# Patient Record
Sex: FEMALE | Race: WHITE | Employment: UNEMPLOYED | URBAN - METROPOLITAN AREA
[De-identification: names, ages, dates, MRNs, and addresses within clinical notes are randomized per-mention and may not be internally consistent; named-entity substitution may affect disease eponyms.]

---
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
## 2024-02-08 ENCOUNTER — HOSPITAL ENCOUNTER (OUTPATIENT)
Dept: CARDIOLOGY | Facility: CLINIC | Age: 19
Discharge: HOME | End: 2024-02-08
Payer: COMMERCIAL

## 2024-02-08 ENCOUNTER — OFFICE VISIT (OUTPATIENT)
Dept: CARDIOLOGY | Facility: CLINIC | Age: 19
End: 2024-02-08
Payer: COMMERCIAL

## 2024-02-08 VITALS
DIASTOLIC BLOOD PRESSURE: 80 MMHG | WEIGHT: 159.9 LBS | OXYGEN SATURATION: 96 % | SYSTOLIC BLOOD PRESSURE: 121 MMHG | BODY MASS INDEX: 32.57 KG/M2 | HEART RATE: 78 BPM

## 2024-02-08 DIAGNOSIS — Q25.3 SUPRAVALVAR AORTIC STENOSIS: ICD-10-CM

## 2024-02-08 DIAGNOSIS — Q25.6 CONGENITAL SUPRAVALVULAR PULMONARY STENOSIS: ICD-10-CM

## 2024-02-08 DIAGNOSIS — I70.1 RENAL ARTERY STENOSIS (H): ICD-10-CM

## 2024-02-08 DIAGNOSIS — Q93.82 WILLIAMS SYNDROME: ICD-10-CM

## 2024-02-08 PROCEDURE — 99213 OFFICE O/P EST LOW 20 MIN: CPT | Mod: 25

## 2024-02-08 PROCEDURE — 93325 DOPPLER ECHO COLOR FLOW MAPG: CPT | Mod: 26 | Performed by: PEDIATRICS

## 2024-02-08 PROCEDURE — 93010 ELECTROCARDIOGRAM REPORT: CPT | Performed by: INTERNAL MEDICINE

## 2024-02-08 PROCEDURE — 93320 DOPPLER ECHO COMPLETE: CPT | Mod: 26 | Performed by: PEDIATRICS

## 2024-02-08 PROCEDURE — 93325 DOPPLER ECHO COLOR FLOW MAPG: CPT

## 2024-02-08 PROCEDURE — 93303 ECHO TRANSTHORACIC: CPT | Mod: 26 | Performed by: PEDIATRICS

## 2024-02-08 PROCEDURE — 93005 ELECTROCARDIOGRAM TRACING: CPT

## 2024-02-08 PROCEDURE — 99204 OFFICE O/P NEW MOD 45 MIN: CPT | Mod: 25

## 2024-02-08 RX ORDER — ATENOLOL 50 MG/1
75 TABLET ORAL DAILY
Qty: 45 TABLET | Refills: 11 | Status: SHIPPED | OUTPATIENT
Start: 2024-02-08

## 2024-02-08 ASSESSMENT — PAIN SCALES - GENERAL: PAINLEVEL: NO PAIN (0)

## 2024-02-08 NOTE — PROGRESS NOTES
Adult Congenital Cardiology New Patient Visit    Patient:  Rebekah Aly MRN:  [id number]   YOB: 2005 Age:  18 year old   Date of Visit:  Feb 8, 2024 PCP:  Elba Mcdowell MD     Dear Dr. Mcdowell,     I had the pleasure of seeing your patient Rebekah Aly at the [facility] on Feb 8, 2024.   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 She was last seen by me in pediatric cardiology clinic in 2019. Rebekah underwent operative repair of supravalvar aortic stenosis on 01/21/2014 by Dr. Sarthak Robert. Postoperatively, Rebekah's hypertension was managed by Dr. Suresh from pediatric neurology who had since retired. She remains on atenolol 75 mg daily and does miss occasional doses .     Rebekah graduated from  last year and is a transitional program. Plans on joining the [employer] and living on campus in the future. She lives at home with family and walks the dog regularly.   She denies any chest pain, SOB, dizziness, LOC, palpitations or other cardiac symptoms. She is menstruating and has heavy menses. She is on iron supplementation.  No hx of arrhythmias.     Cardiac Hx:   H/O VSD- closed spontaneously.  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  Hypertension with renal artery stenosis.      Past Medical History:   Diagnosis Date    * * * SBE PROPHYLAXIS * * *     35-36 completed weeks of gestation(765.28)     Gingival hypertrophy     Gingival hypertrophy     Hypertension     Supravalvular aortic stenosis     Torticollis     VSD (ventricular septal defect)     Kash syndrome      Past Surgical History:   Procedure Laterality Date    ANGIOGRAM      renal arteries    INCISION AND DRAINAGE CHEST WASHOUT, COMBINED  1/21/2014     Procedure: COMBINED INCISION AND DRAINAGE CHEST WASHOUT;  Chest washout;  Surgeon: Sarthak Hughes MD;  Location: [facility] OR    REPAIR VALVE AORTIC CHILD  2014    Procedure: REPAIR VALVE AORTIC CHILD;  Repair of Supravalvar Aortic Stenosis  ;  Surgeon: Sarthak Hughes MD;  Location: [facility] OR     Prescription Medications as of 2024         Rx Number Disp Refills Start End Last Dispensed Date Next Fill Date Owning Pharmacy    atenolol (TENORMIN) 50 MG tablet  45 tablet 11 10/23/2023 --   [id number] - [place], [place] - [facility] [facility] [address] [facility]    Sig: Take 1.5 tablets (75 mg) by mouth daily    Class: E-Prescribe    Route: Oral    calcium carbonate (TUMS) 500 MG chewable tablet  -- --  --       Sig: Take 1 chew tab by mouth 2 times daily    Class: Historical    Route: Oral    Ferrous Gluconate-C-Folic Acid (IRON-C PO)  -- --  --       Class: Historical    Route: Oral    lactobacillus rhamnosus, GG, (CULTURELL) capsule  -- --  --       Sig: Take 1 capsule by mouth 2 times daily    Class: Historical    Route: Oral    MULTI-VITAMIN OR TABS  0 0 2009 --       Si daily in am    Class: Historical    Route: Oral            Allergies: nkda.      Family History:   The family history was reviewed and updated today. No CHD, arrhythmia, sudden death.  HAs twin brother who is in college    Social history:    In HS transition program   Lives with her parents.  Rebekah has three healthy siblings.   No smoking/vaping/EtoH  Parents are co-guardians    Review of Systems: A comprehensive review of systems was performed and is negative, except as noted in the HPI and PMH.  Rebekah is very anxious about new and unknown procedures. We spent a long time today talking about what a MRI might be like in the future.     Physical exam:  Her weight is 72.5 kg (159 lb 14.4 oz). Her blood pressure is 121/80 and her pulse is 78. Her oxygen saturation is 96%.   Her body mass index is 32.57 kg/m .  Her  body surface area is 1.73 meters squared.   Rebekah is a pleasant young woman in no distress. She is comfortable and talkative. She has no central or peripheral cyanosis. Pupils are reactive and sclera are not icteric. There is no conjunctival injection or discharge. EOM grossly intact. She is wearing glasses.  Mucous membranes are moist and pink. Dentition appears healthy with spacing irregularities. Neck is supple with no thyroid enlargement.   Lungs are clear to ausculation bilaterally with no wheezes, rales or rhonchi. There is no increased work of breathing, retractions or nasal flaring. THere is a well healed midline sternotomy scar. Precordium is quiet with a normally placed apical impulse. On auscultation, heart sounds are regular with normal S1 and physiologically split S2. There is a grade 1-2/6 CLARISA at the LMSB with no DM, rubs or gallops.  Abdomen is soft and non-tender without masses or hepatomegaly. Femoral pulses are normal with no brachial femoral delay.Skin is without rashes, lesions, or significant bruising. Extremities are warm and well-perfused with no cyanosis, clubbing or edema. Peripheral pulses are normal and there is < 2 sec capillary refill. Patient is alert and oriented, has developmental delay, outgoing. Moves all extremities equally with normal tone.     12 Lead EKG performed today shows normal sinus rhythm at a rate of 78 bpm with normal intervals. There are nonspecific T wave changes that are not different from prior ECG's. QTC is ULN. Overall unchanged.     A zio patch monitor done in February of 2016 was normal with an average HR of 79 bpm, (range ) rare PAC's (10) and rare PVC's (5). There were no sustained arrhythmias or block. One episode of symptoms was during sinus rhythm at a rate of 71 bpm.     Normal CBC and renal panel October 2023    Echo: Normal LV size and function.   Mild thickening of the aortic valve with no obstruction.    Supravalvar aortic stenosis after  surgical repair.     Dysplastic and thickened aortic valve.There is no aortic valve insufficiency  or stenosis.There is unobstructed antegrade flow in the ascending aorta.  Trivial aortic valve insufficiency.There is mild left atrial enlargement.  Normal left and right ventricular systolic function.The calculated single  plane left ventricular ejection fraction from the 4 chamber view is 59 %.     Compared to the previous echo the aortic valve leaflets looks thickened but  without insufficiency or stenosis. There is mild left atrial enlargement.    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 She does have mild thickening of aortic valve without obstriction. She has known renal artery stenosis and hypertension THat is well controlled on atenolol.   She is asymptomatic from a cardiac standpoint.     Recommendations:    1. Continue atenolol 75 mg daily (refilled)  2. Regular dental cleanings with no SBE prophylaxis required  3. Genetics referral to Dr. Kennedy for Kash syndrome  3. Continue regular activity, including walking, rest if tired or new symptoms.  4. Continue multivit with heavy menstruation  5. Regular dental cleanings, no antibiotic premedication required at present.   6.  Needs to establish care with adult nephrology, consider aortic coronary and renal artery imaging.   7. Follow up 2 years with repeat echocardiogram and ECG   Please call if any new symptoms or concerns.      Thank you for the opportunity to participate in Rebekah's care.    Diagnoses:   Kash syndrome  Status post repair of supravalvular aortic stenosis  Hypertension - currently well controlled.   Renal artery stenosis    Sincerely,    Andrews Chowdhury M.D.   of Pediatrics  Pediatric and Adult Congenital Cardiology  [facility]  [facility]  Pediatric Cardiology Office [phone]  Adult Congenital Cardiology Triage and Scheduling [phone]    A total of 45 minutes were spent in personal review of testing, including echo, ECG and labs, review of documented history and interval events in chart, additional history from father, face to face visit with discussion of findings and plan, and documentation.      CC:  Family of Rebekah Aly

## 2024-02-08 NOTE — LETTER
2/8/2024      RE: Rebekah Aly  [address] [facility] [address]       Dear Colleague,    Thank you for the opportunity to participate in the care of your patient, Rebekah Aly, at the [facility] [place] at [facility]. Please see a copy of my visit note below.    Adult Congenital Cardiology New Patient Visit    Patient:  Rebekah Aly MRN:  [id number]   YOB: 2005 Age:  18 year old   Date of Visit:  Feb 8, 2024 PCP:  Elba Mcdowell MD     Dear Dr. Mcdowell,     I had the pleasure of seeing your patient Rebekah Aly at the [facility] on Feb 8, 2024.   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 She was last seen by me in pediatric cardiology clinic in 2019. Rebekah underwent operative repair of supravalvar aortic stenosis on 01/21/2014 by Dr. Sarthak Robert. Postoperatively, Rebekah's hypertension was managed by Dr. Suresh from pediatric neurology who had since retired. She remains on atenolol 75 mg daily and does miss occasional doses .     Rebekah graduated from  last year and is a transitional program. Plans on joining the [employer] program and living on campus in the future. She lives at home with family and walks the dog regularly.   She denies any chest pain, SOB, dizziness, LOC, palpitations or other cardiac symptoms. She is menstruating and has heavy menses. She is on iron supplementation.  No hx of arrhythmias.     Cardiac Hx:   H/O VSD- closed spontaneously.  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  Hypertension with renal artery stenosis.      Past Medical History:   Diagnosis Date     * * * SBE PROPHYLAXIS * * *      35-36 completed weeks of  gestation(765.28)      Gingival hypertrophy      Gingival hypertrophy      Hypertension      Supravalvular aortic stenosis      Torticollis      VSD (ventricular septal defect)      Kash syndrome      Past Surgical History:   Procedure Laterality Date     ANGIOGRAM      renal arteries     INCISION AND DRAINAGE CHEST WASHOUT, COMBINED  2014    Procedure: COMBINED INCISION AND DRAINAGE CHEST WASHOUT;  Chest washout;  Surgeon: Sarthak Hughes MD;  Location: [facility] OR     REPAIR VALVE AORTIC CHILD  2014    Procedure: REPAIR VALVE AORTIC CHILD;  Repair of Supravalvar Aortic Stenosis  ;  Surgeon: Sarthak Hughes MD;  Location: [facility] OR     Prescription Medications as of 2024         Rx Number Disp Refills Start End Last Dispensed Date Next Fill Date Owning Pharmacy    atenolol (TENORMIN) 50 MG tablet  45 tablet 11 10/23/2023 --   [facility]/pharmacy #[id number] - [place], [place] - [address] [facility]    Sig: Take 1.5 tablets (75 mg) by mouth daily    Class: E-Prescribe    Route: Oral    calcium carbonate (TUMS) 500 MG chewable tablet  -- --  --       Sig: Take 1 chew tab by mouth 2 times daily    Class: Historical    Route: Oral    Ferrous Gluconate-C-Folic Acid (IRON-C PO)  -- --  --       Class: Historical    Route: Oral    lactobacillus rhamnosus, GG, (CULTURELL) capsule  -- --  --       Sig: Take 1 capsule by mouth 2 times daily    Class: Historical    Route: Oral    MULTI-VITAMIN OR TABS  0 0 2009 --       Si daily in am    Class: Historical    Route: Oral            Allergies: nkda.      Family History:   The family history was reviewed and updated today. No CHD, arrhythmia, sudden death.  HAs twin brother who is in college    Social history:    In HS transition program   Lives with her parents.  Rebekah has three healthy siblings.   No smoking/vaping/EtoH  Parents are co-guardians    Review of Systems: A comprehensive review of systems was performed and is negative,  except as noted in the HPI and PMH.  Rebekah is very anxious about new and unknown procedures. We spent a long time today talking about what a MRI might be like in the future.     Physical exam:  Her weight is 72.5 kg (159 lb 14.4 oz). Her blood pressure is 121/80 and her pulse is 78. Her oxygen saturation is 96%.   Her body mass index is 32.57 kg/m .  Her body surface area is 1.73 meters squared.   Rebekah is a pleasant young woman in no distress. She is comfortable and talkative. She has no central or peripheral cyanosis. Pupils are reactive and sclera are not icteric. There is no conjunctival injection or discharge. EOM grossly intact. She is wearing glasses.  Mucous membranes are moist and pink. Dentition appears healthy with spacing irregularities. Neck is supple with no thyroid enlargement.   Lungs are clear to ausculation bilaterally with no wheezes, rales or rhonchi. There is no increased work of breathing, retractions or nasal flaring. THere is a well healed midline sternotomy scar. Precordium is quiet with a normally placed apical impulse. On auscultation, heart sounds are regular with normal S1 and physiologically split S2. There is a grade 1-2/6 CLARISA at the LMSB with no DM, rubs or gallops.  Abdomen is soft and non-tender without masses or hepatomegaly. Femoral pulses are normal with no brachial femoral delay.Skin is without rashes, lesions, or significant bruising. Extremities are warm and well-perfused with no cyanosis, clubbing or edema. Peripheral pulses are normal and there is < 2 sec capillary refill. Patient is alert and oriented, has developmental delay, outgoing. Moves all extremities equally with normal tone.     12 Lead EKG performed today shows normal sinus rhythm at a rate of 78 bpm with normal intervals. There are nonspecific T wave changes that are not different from prior ECG's. QTC is ULN. Overall unchanged.     A zio patch monitor done in February of 2016 was normal with an average HR of  79 bpm, (range ) rare PAC's (10) and rare PVC's (5). There were no sustained arrhythmias or block. One episode of symptoms was during sinus rhythm at a rate of 71 bpm.     Normal CBC and renal panel October 2023    Echo: Normal LV size and function.   Mild thickening of the aortic valve with no obstruction.    Supravalvar aortic stenosis after surgical repair.     Dysplastic and thickened aortic valve.There is no aortic valve insufficiency  or stenosis.There is unobstructed antegrade flow in the ascending aorta.  Trivial aortic valve insufficiency.There is mild left atrial enlargement.  Normal left and right ventricular systolic function.The calculated single  plane left ventricular ejection fraction from the 4 chamber view is 59 %.     Compared to the previous echo the aortic valve leaflets looks thickened but  without insufficiency or stenosis. There is mild left atrial enlargement.    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 She does have mild thickening of aortic valve without obstriction. She has known renal artery stenosis and hypertension THat is well controlled on atenolol.   She is asymptomatic from a cardiac standpoint.     Recommendations:    1. Continue atenolol 75 mg daily (refilled)  2. Regular dental cleanings with no SBE prophylaxis required  3. Genetics referral to Dr. Kennedy for Kash syndrome  3. Continue regular activity, including walking, rest if tired or new symptoms.  4. Continue multivit with heavy menstruation  5. Regular dental cleanings, no antibiotic premedication required at present.   6.  Needs to establish care with adult nephrology, consider aortic coronary and renal artery imaging.   7. Follow up 2 years with repeat echocardiogram and ECG   Please call if any new symptoms or concerns.      Thank you for the  opportunity to participate in Rebekah's care.    Diagnoses:   Kash syndrome  Status post repair of supravalvular aortic stenosis  Hypertension - currently well controlled.   Renal artery stenosis    Sincerely,    Andrews Chowdhury M.D.   of Pediatrics  Pediatric and Adult Congenital Cardiology  [facility]  Pediatric Cardiology Office [phone]  Adult Congenital Cardiology Triage and Scheduling [phone]    A total of 45 minutes were spent in personal review of testing, including echo, ECG and labs, review of documented history and interval events in chart, additional history from father, face to face visit with discussion of findings and plan, and documentation.      CC:  Family of Rebekah Aly      Please do not hesitate to contact me if you have any questions/concerns.     Sincerely,     Andrews Chowdhury MD

## 2024-02-08 NOTE — PATIENT INSTRUCTIONS
You were seen today in the Adult Congenital and Cardiovascular Genetics Clinic at the [facility].    Cardiology Providers you saw during your visit:  Andrews Chowdhury MD    Diagnosis:  Kash syndrome    Results:  Andrews Chowdhury MD reviewed the results of your EKG and ECHO testing today in clinic.    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 They should be contacting you in the next few weeks. Please call us at [phone] if not, and we can help get you connected.       General Cardiac Recommendations:  Continue to eat a heart healthy, low salt diet.  Continue to get 20-30 minutes of aerobic activity, 4-5 days per week.  Examples of aerobic activity include walking, running, swimming, cycling, etc.  Continue to observe good oral hygiene, with regular dental visits.      SBE prophylaxis:   Yes____  No__x__    If YES is checked, follow the recommendations outlined below:  Take antibiotic(s) prior to recommended dental procedures and procedures on the respiratory tract or with infected skin, muscle or bones. SBE prophylaxis is not needed for routine GI and  procedures (ie. Colonoscopy or vaginal delivery)  Observe good oral hygiene daily, as advised by your dentist. Get regular professional dental care.  Keep cuts clean.  Infections should be treated promptly.  Symptoms of Infective Endocarditis could include: fever lasting more than 4-5 days or a recurrent fever that initially resolves but returns within 1-2 days)      Exercise restrictions:   Yes__X__  No____         If yes, list restrictions:  Must be allowed to rest if fatigued or SOB      FASTING CHOLESTEROL was checked in the last 5 years YES____  NO__x__ (2015)  If no, please follow up with your primary care physician. You should have a cholesterol screening every 5 years.      Follow-up:  Follow up with Dr. Chowdhury in 2 years with an ECHO and  EKG prior.     If you have questions or concerns please contact us at:    Beth Hirsch RN, BSN    Valentine Godoy (Scheduling)  Nurse Care Coordinator     Clinic   Adult Congenital and CV Genetics   Adult Congenital and CV Genetic  [facility]   [facility]  (P) [phone]     (P) [phone]  (F) [phone]     (F) [phone]      For after hours urgent needs, call [phone] and ask to speak to the Adult Congenital Physician on call.  Mention Job Code 0401.    For emergencies call 911.    [facility]   Clinics and Surgery Center  Mail Code [id number]  [address] [id number]

## 2024-02-09 LAB
ATRIAL RATE - MUSE: 78 BPM
DIASTOLIC BLOOD PRESSURE - MUSE: NORMAL MMHG
INTERPRETATION ECG - MUSE: NORMAL
P AXIS - MUSE: 57 DEGREES
PR INTERVAL - MUSE: 124 MS
QRS DURATION - MUSE: 86 MS
QT - MUSE: 390 MS
QTC - MUSE: 444 MS
R AXIS - MUSE: 46 DEGREES
SYSTOLIC BLOOD PRESSURE - MUSE: NORMAL MMHG
T AXIS - MUSE: 71 DEGREES
VENTRICULAR RATE- MUSE: 78 BPM

## 2024-02-12 ENCOUNTER — TELEPHONE (OUTPATIENT)
Dept: PEDIATRICS | Facility: CLINIC | Age: 19
End: 2024-02-12
Payer: COMMERCIAL

## 2024-02-12 NOTE — TELEPHONE ENCOUNTER
Patient Quality Outreach    Patient is due for the following:   IVD  -  IVD Follow-up Visit    Next Steps:   Pt was seen at cardiology on 2/8/24    Type of outreach:    Chart review performed, no outreach needed.      Questions for provider review:    None           Daysi Russo MA

## 2024-02-19 ENCOUNTER — TELEPHONE (OUTPATIENT)
Dept: CONSULT | Facility: CLINIC | Age: 19
End: 2024-02-19
Payer: COMMERCIAL

## 2024-02-19 NOTE — TELEPHONE ENCOUNTER
LVM for patient to call back to schedule new patient Genetics appointment with Dr. Kennedy, with GC visit prior. When patient calls back, please assist in scheduling IN PERSON appointment. If patient requests virtual visit, please route note back to Genetics scheduling pool for approval prior to scheduling.     Please advise patient to complete intake form via [facility],  as well as have outside records/previous genetic test reports sent prior to appointment date. Thank you.

## 2024-11-01 SDOH — HEALTH STABILITY: PHYSICAL HEALTH: ON AVERAGE, HOW MANY DAYS PER WEEK DO YOU ENGAGE IN MODERATE TO STRENUOUS EXERCISE (LIKE A BRISK WALK)?: 1 DAY

## 2024-11-01 SDOH — HEALTH STABILITY: PHYSICAL HEALTH: ON AVERAGE, HOW MANY MINUTES DO YOU ENGAGE IN EXERCISE AT THIS LEVEL?: 30 MIN

## 2024-11-01 ASSESSMENT — SOCIAL DETERMINANTS OF HEALTH (SDOH): HOW OFTEN DO YOU GET TOGETHER WITH FRIENDS OR RELATIVES?: ONCE A WEEK

## 2024-11-04 ENCOUNTER — OFFICE VISIT (OUTPATIENT)
Dept: PEDIATRICS | Facility: CLINIC | Age: 19
End: 2024-11-04
Attending: PEDIATRICS
Payer: COMMERCIAL

## 2024-11-04 VITALS
DIASTOLIC BLOOD PRESSURE: 79 MMHG | SYSTOLIC BLOOD PRESSURE: 114 MMHG | WEIGHT: 163.38 LBS | HEIGHT: 60 IN | TEMPERATURE: 97.5 F | OXYGEN SATURATION: 96 % | RESPIRATION RATE: 20 BRPM | BODY MASS INDEX: 32.08 KG/M2 | HEART RATE: 59 BPM

## 2024-11-04 DIAGNOSIS — I15.0 RENOVASCULAR HYPERTENSION: ICD-10-CM

## 2024-11-04 DIAGNOSIS — I70.1 RENAL ARTERY STENOSIS (H): ICD-10-CM

## 2024-11-04 DIAGNOSIS — Q25.6 CONGENITAL SUPRAVALVULAR PULMONARY STENOSIS: ICD-10-CM

## 2024-11-04 DIAGNOSIS — Z00.00 ENCOUNTER FOR PREVENTATIVE ADULT HEALTH CARE EXAMINATION: Primary | ICD-10-CM

## 2024-11-04 LAB
ALBUMIN UR-MCNC: NEGATIVE MG/DL
ANION GAP SERPL CALCULATED.3IONS-SCNC: 11 MMOL/L (ref 7–15)
APPEARANCE UR: CLEAR
BILIRUB UR QL STRIP: NEGATIVE
BUN SERPL-MCNC: 14.6 MG/DL (ref 6–20)
CALCIUM SERPL-MCNC: 10 MG/DL (ref 8.8–10.4)
CHLORIDE SERPL-SCNC: 104 MMOL/L (ref 98–107)
COLOR UR AUTO: YELLOW
CREAT SERPL-MCNC: 0.67 MG/DL (ref 0.51–0.95)
EGFRCR SERPLBLD CKD-EPI 2021: >90 ML/MIN/1.73M2
GLUCOSE SERPL-MCNC: 99 MG/DL (ref 70–99)
GLUCOSE UR STRIP-MCNC: NEGATIVE MG/DL
HCO3 SERPL-SCNC: 24 MMOL/L (ref 22–29)
HGB UR QL STRIP: NEGATIVE
KETONES UR STRIP-MCNC: NEGATIVE MG/DL
LEUKOCYTE ESTERASE UR QL STRIP: NEGATIVE
NITRATE UR QL: NEGATIVE
PH UR STRIP: 5 [PH] (ref 5–7)
POTASSIUM SERPL-SCNC: 5 MMOL/L (ref 3.4–5.3)
SODIUM SERPL-SCNC: 139 MMOL/L (ref 135–145)
SP GR UR STRIP: 1.01 (ref 1–1.03)
UROBILINOGEN UR STRIP-ACNC: 0.2 E.U./DL

## 2024-11-04 PROCEDURE — 90471 IMMUNIZATION ADMIN: CPT | Performed by: PEDIATRICS

## 2024-11-04 PROCEDURE — 36415 COLL VENOUS BLD VENIPUNCTURE: CPT | Performed by: PEDIATRICS

## 2024-11-04 PROCEDURE — 99173 VISUAL ACUITY SCREEN: CPT | Mod: 59 | Performed by: PEDIATRICS

## 2024-11-04 PROCEDURE — 81003 URINALYSIS AUTO W/O SCOPE: CPT | Performed by: PEDIATRICS

## 2024-11-04 PROCEDURE — 80048 BASIC METABOLIC PNL TOTAL CA: CPT | Performed by: PEDIATRICS

## 2024-11-04 PROCEDURE — 99395 PREV VISIT EST AGE 18-39: CPT | Mod: 25 | Performed by: PEDIATRICS

## 2024-11-04 PROCEDURE — 92551 PURE TONE HEARING TEST AIR: CPT | Performed by: PEDIATRICS

## 2024-11-04 PROCEDURE — 90656 IIV3 VACC NO PRSV 0.5 ML IM: CPT | Performed by: PEDIATRICS

## 2024-11-04 ASSESSMENT — PAIN SCALES - GENERAL: PAINLEVEL_OUTOF10: NO PAIN (0)

## 2024-11-04 NOTE — PROGRESS NOTES
Preventive Care Visit  [facility]  Mia Camilo MD, Pediatrics  Nov 4, 2024    Assessment & Plan   19 year old, here for preventive care.    (Z00.00) Encounter for preventative adult health care examination  (primary encounter diagnosis)  Plan: BASIC METABOLIC PANEL, Urinalysis Macroscopic -        lab collect        Will need to transition care to adult medicine in the future  Strongly encouraged mom to get prevnar 20 given Rebekah's heart condition  Will schedule tdap after 11/26/24  Discussed weight and importance of physical activity - currently walking around the school and has an milton that monitors her steps and calories burned  Order urine and bmp as per last nephro note      (Q25.6) Congenital Supravalvular Aortic Stenosis  Plan: reviewed cardio note  - stable from a cardiac standpoint, next follow up in 2026  Needs follow up with Genetics and Adult nephrology  Continue atenolol    (I70.1) Renal artery stenosis  Plan: f/up nephrology    (I15.0) Renovascular hypertension  Plan: continue atenolol, f/up neprhology    Growth      Normal height and weight    Immunizations   Appropriate vaccinations were ordered.   MenB Vaccine not indicated.    Immunizations Administered       Name Date Dose VIS Date Route    Influenza, Split Virus, Trivalent, Pf (Fluzone\Fluarix) 11/4/24  9:25 AM 0.5 mL 08/06/2021,Given Today Intramuscular          Anticipatory Guidance    Reviewed age appropriate anticipatory guidance.       Referrals/Ongoing Specialty Care  Ongoing care with adult cardio and adult nephrology  Verbal Dental Referral: Patient has established dental home        Subjective   Rebekah is presenting for the following:  Well Child    Was seen by adult cardio and stable from a cardiac standpoint, still has to make appt with genetics and adult nephrology  Discussed vaccines such as prevnar 20  - mom declines wants to read more about it and men B - currently no dorming,   Will be due for tdap  "after 11/26 11/4/2024     8:48 AM   Additional Questions   Accompanied by mom           11/1/2024   Social   Lack of transportation has limited access to appts/meds No   Do you have housing? (Housing is defined as stable permanent housing and does not include staying ouside in a car, in a tent, in an abandoned building, in an overnight shelter, or couch-surfing.) Yes   Are you worried about losing your housing? No             No data to display                      No data to display                 No results for input(s): \"CHOL\", \"HDL\", \"LDL\", \"TRIG\", \"CHOLHDLRATIO\" in the last 02196 hours.         No data to display                  11/1/2024   Diet   In past 12 months, concerned food might run out No   In past 12 months, food has run out/couldn't afford more No            11/1/2024   Activity   Days per week of moderate/strenuous exercise 1 day   On average, how many minutes do you engage in exercise at this level? 30 min           No data to display                   No data to display                   No data to display                   No data to display                Objective     Exam  /79   Pulse 59   Temp 97.5  F (36.4  C) (Tympanic)   Resp 20   Ht 4' 11.5\" (1.511 m)   Wt 163 lb 6 oz (74.1 kg)   LMP 10/28/2024 (Exact Date)   SpO2 96%   BMI 32.45 kg/m    Facility age limit for growth %andrés is 20 years.  Facility age limit for growth %andrés is 20 years.  Facility age limit for growth %andrés is 20 years.  Growth %ile SmartLinks can only be used for patients less than 20 years old.    Vision Screen  Vision Screen Details  Does the patient have corrective lenses (glasses/contacts)?: No  No Corrective Lenses, PLUS LENS REQUIRED: Pass  Vision Acuity Screen  Vision Acuity Tool: Edwin  RIGHT EYE: 10/16 (20/32)  LEFT EYE: 10/12.5 (20/25)  Is there a two line difference?: No  Vision Screen Results: Pass    Hearing Screen  RIGHT EAR  1000 Hz on Level 40 dB (Conditioning sound): Pass  1000 Hz on " Level 20 dB: Pass  2000 Hz on Level 20 dB: Pass  4000 Hz on Level 20 dB: Pass  6000 Hz on Level 20 dB: Pass  8000 Hz on Level 20 dB: Pass  LEFT EAR  8000 Hz on Level 20 dB: Pass  6000 Hz on Level 20 dB: Pass  4000 Hz on Level 20 dB: Pass  2000 Hz on Level 20 dB: Pass  1000 Hz on Level 20 dB: Pass  500 Hz on Level 25 dB: Pass  RIGHT EAR  500 Hz on Level 25 dB: Pass  Results  Hearing Screen Results: Pass      Physical Exam  GENERAL: Active, alert, in no acute distress.  SKIN: Clear. No significant rash, abnormal pigmentation or lesions  HEAD: Normocephalic  EYES: Pupils equal, round, reactive, Extraocular muscles intact. Normal conjunctivae.  EARS: Normal canals. Tympanic membranes are normal; gray and translucent.  NOSE: Normal without discharge.  MOUTH/THROAT: Clear. No oral lesions. Teeth without obvious abnormalities.  NECK: Supple, no masses.  No thyromegaly.  LYMPH NODES: No adenopathy  HEART: 2/6 systolic murmur lusb  LUNGS: Clear. No rales, rhonchi, wheezing or retractions  ABDOMEN: Soft, non-tender, not distended, no masses or hepatosplenomegaly. Bowel sounds normal.   NEUROLOGIC: No focal findings. Cranial nerves grossly intact: DTR's normal. Normal gait, strength and tone  BACK: Spine is straight, no scoliosis.  EXTREMITIES: Full range of motion, no deformities    Signed Electronically by: Mia Camilo MD

## 2024-11-27 ENCOUNTER — ALLIED HEALTH/NURSE VISIT (OUTPATIENT)
Dept: FAMILY MEDICINE | Facility: CLINIC | Age: 19
End: 2024-11-27
Payer: COMMERCIAL

## 2024-11-27 VITALS — TEMPERATURE: 97.4 F

## 2024-11-27 DIAGNOSIS — Z23 ENCOUNTER FOR IMMUNIZATION: Primary | ICD-10-CM

## 2024-11-27 PROCEDURE — 99207 PR NO CHARGE NURSE ONLY: CPT

## 2024-11-27 PROCEDURE — 90715 TDAP VACCINE 7 YRS/> IM: CPT

## 2024-11-27 PROCEDURE — 90471 IMMUNIZATION ADMIN: CPT

## 2024-11-27 NOTE — PROGRESS NOTES
Prior to immunization administration, verified patients identity using patient s name and date of birth. Please see Immunization Activity for additional information.     Is the patient's temperature normal (100.5 or less)? Yes     Patient MEETS CRITERIA. PROCEED with vaccine administration.      Patient instructed to remain in clinic for 15 minutes afterwards, and to report any adverse reactions.      Link to Ancillary Visit Immunization Standing Orders SmartSet     Screening performed by Kecia Yeh MA on 11/27/2024 at 9:32 AM.

## 2025-01-17 ENCOUNTER — TELEPHONE (OUTPATIENT)
Dept: PEDIATRICS | Facility: CLINIC | Age: 20
End: 2025-01-17
Payer: COMMERCIAL

## 2025-01-17 NOTE — TELEPHONE ENCOUNTER
Forms/Letter Request    Type of form/letter: ICD 10 Diagnosis- Printed problem list and attached to form to review/update    Do we have the form/letter: Yes: placed in providers basket     Who is the form from? [facility]    Where did/will the form come from? form was faxed in    When is form/letter needed by: asap    How would you like the form/letter returned: Fax : [phone]    Patient Notified form requests are processed in 5-7 business days:N/A    Could we send this information to you in [employer] or would you prefer to receive a phone call?:

## 2025-01-20 NOTE — TELEPHONE ENCOUNTER
Tez Molina,     I reviewed this form but some of her care team either retired or left - I am not sure how to update it - she also has to transition to the adult side as well. Can you stop by to discuss?

## 2025-01-21 NOTE — TELEPHONE ENCOUNTER
"Called and informed [facility] that icd codes need to be updated from specialists so its not \"up to date\" essentially. She verbally understood.  Faxed ICD codes to [facility] @ [phone].  Thank you,  Tracy RODRIGUEZ    [phone]   "

## 2025-02-14 DIAGNOSIS — I70.1 RENAL ARTERY STENOSIS: ICD-10-CM

## 2025-02-18 RX ORDER — ATENOLOL 50 MG/1
75 TABLET ORAL DAILY
Qty: 135 TABLET | Refills: 3 | Status: SHIPPED | OUTPATIENT
Start: 2025-02-18